# Patient Record
Sex: FEMALE | URBAN - METROPOLITAN AREA
[De-identification: names, ages, dates, MRNs, and addresses within clinical notes are randomized per-mention and may not be internally consistent; named-entity substitution may affect disease eponyms.]

---

## 2019-08-14 ENCOUNTER — APPOINTMENT (RX ONLY)
Dept: URBAN - METROPOLITAN AREA CLINIC 17 | Facility: CLINIC | Age: 39
Setting detail: DERMATOLOGY
End: 2019-08-14

## 2019-08-14 DIAGNOSIS — L57.8 OTHER SKIN CHANGES DUE TO CHRONIC EXPOSURE TO NONIONIZING RADIATION: ICD-10-CM

## 2019-08-14 DIAGNOSIS — L21.8 OTHER SEBORRHEIC DERMATITIS: ICD-10-CM

## 2019-08-14 PROBLEM — L30.9 DERMATITIS, UNSPECIFIED: Status: ACTIVE | Noted: 2019-08-14

## 2019-08-14 PROBLEM — E05.90 THYROTOXICOSIS, UNSPECIFIED WITHOUT THYROTOXIC CRISIS OR STORM: Status: ACTIVE | Noted: 2019-08-14

## 2019-08-14 PROCEDURE — 99202 OFFICE O/P NEW SF 15 MIN: CPT

## 2019-08-14 PROCEDURE — ? COUNSELING

## 2019-08-14 PROCEDURE — ? OTHER

## 2019-08-14 PROCEDURE — ? PRESCRIPTION

## 2019-08-14 PROCEDURE — ? PRODUCT LINE (PHARMACEUTICALS)

## 2019-08-14 RX ORDER — DOXYCYCLINE HYCLATE 100 MG/1
CAPSULE, GELATIN COATED ORAL
Qty: 60 | Refills: 0 | Status: ERX | COMMUNITY
Start: 2019-08-14

## 2019-08-14 RX ORDER — FLUOCINONIDE 0.5 MG/ML
SOLUTION TOPICAL QHS
Qty: 1 | Refills: 2 | Status: ERX | COMMUNITY
Start: 2019-08-14

## 2019-08-14 RX ADMIN — DOXYCYCLINE HYCLATE: 100 CAPSULE, GELATIN COATED ORAL at 20:53

## 2019-08-14 RX ADMIN — FLUOCINONIDE: 0.5 SOLUTION TOPICAL at 20:49

## 2019-08-14 ASSESSMENT — LOCATION DETAILED DESCRIPTION DERM
LOCATION DETAILED: LEFT CENTRAL MALAR CHEEK
LOCATION DETAILED: RIGHT INFERIOR CENTRAL MALAR CHEEK
LOCATION DETAILED: LEFT SUPERIOR OCCIPITAL SCALP
LOCATION DETAILED: INFERIOR MID FOREHEAD

## 2019-08-14 ASSESSMENT — LOCATION ZONE DERM
LOCATION ZONE: FACE
LOCATION ZONE: SCALP

## 2019-08-14 ASSESSMENT — LOCATION SIMPLE DESCRIPTION DERM
LOCATION SIMPLE: INFERIOR FOREHEAD
LOCATION SIMPLE: POSTERIOR SCALP
LOCATION SIMPLE: RIGHT CHEEK
LOCATION SIMPLE: LEFT CHEEK

## 2019-08-14 NOTE — PROCEDURE: OTHER
Other (Free Text): Recommend Luis Daniel Hwang medicated shampoo alternating with Head and Shoulders shampoo every other month. Patient instructed to RTC in 1 month. If no improvement, may plan to punch biopsy.
Detail Level: Detailed
Note Text (......Xxx Chief Complaint.): This diagnosis correlates with the

## 2019-08-14 NOTE — PROCEDURE: PRODUCT LINE (PHARMACEUTICALS)
Product 40 Units: 0
Product 24 Price (In Dollars - Numeric Only, No Special Characters Or $): 0.00
Product 12 Application Directions: Apply to affected area once a day x 2 weeks, stop treating for 2 weeks then retreat for 2 weeks
Product 7 Application Directions: Apply affected area QD to BID
Product 4 Application Directions: Apply to areas of dark pigmentation once to twice daily
Product 5 Price (In Dollars - Numeric Only, No Special Characters Or $): 50.00
Allow Plan To Count Towards E/M Coding: Yes
Name Of Product 13: Tazarotene 0.05%
Name Of Product 8: Acne Triple Gel
Product 5 Application Directions: Apply to face twice daily with moisturizer
Product 2 Application Directions: Apply affected area QD-BID
Name Of Product 6: Tretinoin 0.1% cream
Name Of Product 11: Tretinoin 0.025%
Name Of Product 3: Clobetasol Cream
Product 13 Application Directions: Apply 1-2 pumps 1 time weekly at night, increase daily as tolerated
Name Of Product 9: Tretinoin 0.05%
Name Of Product 1: Clobetasol 5% solution
Product 6 Application Directions: Apply to face nightly
Product 3 Application Directions: Wash affected area of scalp once daily
Name Of Product 7: Seborrheic Dermatitis Cream
Name Of Product 12: Imiquimod
Product 9 Units: 1
Name Of Product 4: Melasma Emulsion
Product 9 Application Directions: Apply to face QHS
Detail Level: Zone
Name Of Product 5: Rosacea triple gel
Name Of Product 10: Actinic keratosis gel
Product 1 Application Directions: Apply affected area QHS